# Patient Record
Sex: MALE | Race: WHITE | HISPANIC OR LATINO | URBAN - METROPOLITAN AREA
[De-identification: names, ages, dates, MRNs, and addresses within clinical notes are randomized per-mention and may not be internally consistent; named-entity substitution may affect disease eponyms.]

---

## 2023-03-24 ENCOUNTER — APPOINTMENT (RX ONLY)
Dept: URBAN - METROPOLITAN AREA CLINIC 168 | Facility: CLINIC | Age: 48
Setting detail: DERMATOLOGY
End: 2023-03-24

## 2023-03-24 DIAGNOSIS — B07.8 OTHER VIRAL WARTS: ICD-10-CM

## 2023-03-24 DIAGNOSIS — L72.0 EPIDERMAL CYST: ICD-10-CM

## 2023-03-24 PROCEDURE — 11900 INJECT SKIN LESIONS </W 7: CPT

## 2023-03-24 PROCEDURE — ? INTRALESIONAL KENALOG

## 2023-03-24 PROCEDURE — ? PRESCRIPTION MEDICATION MANAGEMENT

## 2023-03-24 PROCEDURE — 99203 OFFICE O/P NEW LOW 30 MIN: CPT | Mod: 25

## 2023-03-24 PROCEDURE — ? COUNSELING

## 2023-03-24 PROCEDURE — ? FULL BODY SKIN EXAM - DECLINED

## 2023-03-24 PROCEDURE — ? PRESCRIPTION

## 2023-03-24 PROCEDURE — ? ADDITIONAL NOTES

## 2023-03-24 PROCEDURE — ? MEDICATION COUNSELING

## 2023-03-24 RX ORDER — DOXYCYCLINE HYCLATE 100 MG/1
CAPSULE, GELATIN COATED ORAL
Qty: 14 | Refills: 0 | Status: ERX | COMMUNITY
Start: 2023-03-24

## 2023-03-24 RX ADMIN — DOXYCYCLINE HYCLATE: 100 CAPSULE, GELATIN COATED ORAL at 00:00

## 2023-03-24 ASSESSMENT — LOCATION SIMPLE DESCRIPTION DERM
LOCATION SIMPLE: RIGHT UPPER BACK
LOCATION SIMPLE: LEFT INDEX FINGER

## 2023-03-24 ASSESSMENT — LOCATION ZONE DERM
LOCATION ZONE: FINGER
LOCATION ZONE: TRUNK

## 2023-03-24 ASSESSMENT — LOCATION DETAILED DESCRIPTION DERM
LOCATION DETAILED: RIGHT INFERIOR UPPER BACK
LOCATION DETAILED: LEFT PROXIMAL DORSAL INDEX FINGER

## 2023-03-24 NOTE — PROCEDURE: MEDICATION COUNSELING
EMULSIFICATION IOL IMPLANT  (Left Eye)    COLECTOMY  ? benign mass of colon    COLONOSCOPY      HERNIA REPAIR  years ago    inguinal    INSERTABLE CARDIAC MONITOR  2016    left upper chest    MYRINGOTOMY AND TYMPANOSTOMY TUBE PLACEMENT Right 16    MYRINGOTOMY AND TYMPANOSTOMY TUBE PLACEMENT Right 3/14/2019    RIGHT MYRINGOTOMY TUBE INSERTION performed by Julianna Macias MD at [facility] EARDRUM OPENING,GEN ANESTH Right 2018    RIGHT MYRINGOTOMY  T TUBE INSERTION performed by Julianna Macias MD at [address] [place] [address] Left 3/22/2018    LEFT EYE CATARACT EMULSIFICATION IOL IMPLANT  performed by Alex Rodas MD at [address] [place] [address] Right 2018    RIGHT EYE CATARACT EXTRACTION WITH  IOL IMPLANT performed by Alex Rodas MD at [facility] TYMPANOSTOMY TUBE PLACEMENT Right     x2       History reviewed. No pertinent family history.     Social History:   Social History     Socioeconomic History    Marital status:      Spouse name: Not on file    Number of children: Not on file    Years of education: Not on file    Highest education level: Not on file   Occupational History    Not on file   Social Needs    Financial resource strain: Not on file    Food insecurity:     Worry: Not on file     Inability: Not on file    Transportation needs:     Medical: Not on file     Non-medical: Not on file   Tobacco Use    Smoking status: Former Smoker     Last attempt to quit: 1962     Years since quittin.0    Smokeless tobacco: Current User     Types: Chew   Substance and Sexual Activity    Alcohol use: No    Drug use: No    Sexual activity: Not on file   Lifestyle    Physical activity:     Days per week: Not on file     Minutes per session: Not on file    Stress: Not on file   Relationships    Social connections:     Talks on phone: Not on file tremors Instructed to take am of procedure    CURRENT MEDS :  Scheduled Meds:   calcium elemental  500 mg Oral Daily    lisinopril  20 mg Oral Daily    oxybutynin  5 mg Oral Daily    primidone  50 mg Oral BID    tamsulosin  0.4 mg Oral Daily    furosemide  40 mg Oral Daily    nicotine  1 patch Transdermal Daily       Continuous Infusions: Allergies   Allergen Reactions    Pcn [Penicillins] Rash       REVIEW OF SYSTEMS:  Constitutional: Denies fever, weight loss, night sweats, and fatigue  Skin: Denies pigmentation, dark lesions, and rashes   HEENT: Denies hearing loss, tinnitus, ear drainage, epistaxis, sore throat, and hoarseness. Cardiovascular: Denies palpitations, chest pain, and chest pressure. Respiratory: Denies cough, dyspnea at rest, hemoptysis, apnea, and choking. Gastrointestinal: Denies nausea, vomiting, poor appetite, diarrhea, heartburn or reflux  Genitourinary: Denies dysuria, frequency, urgency or hematuria  Musculoskeletal: Denies myalgias, muscle weakness, and bone pain  Neurological: Denies dizziness, vertigo, headache, and focal weakness  Psychological: Denies anxiety and depression  Endocrine: Denies heat intolerance and cold intolerance  Hematopoietic/Lymphatic: Denies bleeding problems and blood transfusions    OBJECTIVE:   BP (!) 161/73   Pulse 80   Temp 99.3 °F (37.4 °C) (Temporal)   Resp 20   Ht 5' 10\" (1.778 m)   Wt 147 lb (66.7 kg)   SpO2 92%   BMI 21.09 kg/m²   SpO2 Readings from Last 1 Encounters:   07/30/19 92%        I/O:    Intake/Output Summary (Last 24 hours) at 7/30/2019 0938  Last data filed at 7/30/2019 0913  Gross per 24 hour   Intake 0 ml   Output 450 ml   Net -450 ml     Physical Exam:  General: The patient is lying in bed comfortably without any distress.   Breathing is not labored  HEENT: Pupils are equal round and reactive to light, there are no oral lesions and no post-nasal drip   Neck: supple without adenopathy  Cardiovascular: regular rate and Calcipotriene Counseling:  I discussed with the patient the risks of calcipotriene including but not limited to erythema, scaling, itching, and irritation.

## 2023-03-24 NOTE — PROCEDURE: MEDICATION COUNSELING
Subjective:       Patient ID: Charles Glynn is a 84 y.o. male.    Chief Complaint: Back Pain    HPI   Pt bent forward about 10 days ago and felt sharp pain in L lower back   Pt says pain has continued since  Pt. Has used home PT with only mild help  No radiation of pain into legs  Review of Systems   Constitutional: Positive for activity change. Negative for appetite change, chills, diaphoresis, fatigue, fever and unexpected weight change.   HENT: Negative.    Eyes: Negative.    Respiratory: Negative.    Cardiovascular: Negative.    Gastrointestinal: Negative.    Endocrine: Negative.    Genitourinary: Negative.    Musculoskeletal: Positive for back pain.   Skin: Negative.    Neurological: Negative.        Objective:      Physical Exam   Constitutional: He appears well-developed and well-nourished. No distress.   HENT:   Head: Normocephalic and atraumatic.   Eyes: Conjunctivae are normal. No scleral icterus.   Neck: Normal range of motion. Neck supple.   Musculoskeletal: He exhibits tenderness. He exhibits no edema or deformity.   Tight and tender lumbar paravertebral muscles   Tender L 1 area on vertebral percussion without ridicular pain  SLR's 45 degrees bilat with L lower back pain with L leg at 45 degrees  Lower ext neurovascular intact     Neurological: He has normal reflexes.   Skin: Skin is warm and dry. No rash noted.   Vitals reviewed.      Assessment:       1. Left-sided low back pain without sciatica, unspecified chronicity    2. Lumbar paraspinal muscle spasm        Plan:       Charles was seen today for back pain.    Diagnoses and all orders for this visit:    Left-sided low back pain without sciatica, unspecified chronicity  -     X-Ray Lumbar Spine AP And Lateral; Future  -     Ambulatory consult to Physical Therapy    Lumbar paraspinal muscle spasm  -     X-Ray Lumbar Spine AP And Lateral; Future  -     Ambulatory consult to Physical Therapy    discussed home PT  OK to start PT if no compression fx on  xray  ( will notify )   Cosentyx Counseling:  I discussed with the patient the risks of Cosentyx including but not limited to worsening of Crohn's disease, immunosuppression, allergic reactions and infections.  The patient understands that monitoring is required including a PPD at baseline and must alert us or the primary physician if symptoms of infection or other concerning signs are noted.

## 2023-03-24 NOTE — PROCEDURE: PRESCRIPTION MEDICATION MANAGEMENT
Initiate Treatment: Wart thwart pen ([employer]) apply to affected areas at bedtime.Wash off in the morning and apply duct tape. Initiate Treatment: Wart thwart pen ([employer]) apply to affected areas at bedtime.Wash off in the morning and apply duct tape.

## 2023-03-24 NOTE — PROCEDURE: ADDITIONAL NOTES
Detail Level: Simple
Additional Notes: Pt was quoted an estimate of 643 for the surgical excision - estimate based on current size of lesion. Discussed could range more or less based on final size once inflammation has resolved.
Render Risk Assessment In Note?: no

## 2023-06-21 NOTE — PROCEDURE: MEDICATION COUNSELING
Odomzo Counseling- I discussed with the patient the risks of Odomzo including but not limited to nausea, vomiting, diarrhea, constipation, weight loss, changes in the sense of taste, decreased appetite, muscle spasms, and hair loss.  The patient verbalized understanding of the proper use and possible adverse effects of Odomzo.  All of the patient's questions and concerns were addressed. Oral Minoxidil Counseling- I discussed with the patient the risks of oral minoxidil including but not limited to shortness of breath, swelling of the feet or ankles, dizziness, lightheadedness, unwanted hair growth and allergic reaction.  The patient verbalized understanding of the proper use and possible adverse effects of oral minoxidil.  All of the patient's questions and concerns were addressed.